# Patient Record
Sex: MALE | Race: WHITE | NOT HISPANIC OR LATINO
[De-identification: names, ages, dates, MRNs, and addresses within clinical notes are randomized per-mention and may not be internally consistent; named-entity substitution may affect disease eponyms.]

---

## 2020-03-16 ENCOUNTER — APPOINTMENT (OUTPATIENT)
Dept: INTERNAL MEDICINE | Facility: CLINIC | Age: 70
End: 2020-03-16
Payer: MEDICARE

## 2020-03-16 VITALS
DIASTOLIC BLOOD PRESSURE: 70 MMHG | HEART RATE: 68 BPM | SYSTOLIC BLOOD PRESSURE: 146 MMHG | BODY MASS INDEX: 26.22 KG/M2 | TEMPERATURE: 98.4 F | HEIGHT: 69 IN | WEIGHT: 177 LBS

## 2020-03-16 VITALS — DIASTOLIC BLOOD PRESSURE: 80 MMHG | SYSTOLIC BLOOD PRESSURE: 130 MMHG

## 2020-03-16 DIAGNOSIS — Z78.9 OTHER SPECIFIED HEALTH STATUS: ICD-10-CM

## 2020-03-16 DIAGNOSIS — Z80.1 FAMILY HISTORY OF MALIGNANT NEOPLASM OF TRACHEA, BRONCHUS AND LUNG: ICD-10-CM

## 2020-03-16 PROCEDURE — 36415 COLL VENOUS BLD VENIPUNCTURE: CPT

## 2020-03-16 PROCEDURE — 99204 OFFICE O/P NEW MOD 45 MIN: CPT | Mod: 25

## 2020-03-16 RX ORDER — LATANOPROST/PF 0.005 %
0.01 DROPS OPHTHALMIC (EYE)
Qty: 8 | Refills: 0 | Status: ACTIVE | COMMUNITY
Start: 2020-01-16

## 2020-03-16 NOTE — PHYSICAL EXAM
[Normal] : normal gait, coordination grossly intact, no focal deficits [de-identified] : cataracts [de-identified] : ROSIE 2/6 Ao area   s1 s2 RR [de-identified] : decreased pulses bilat DP

## 2020-03-16 NOTE — HISTORY OF PRESENT ILLNESS
[FreeTextEntry8] : here to establish care , former px of dr Blanc , last visit 2017\par will get cataract surgery sometimes in April

## 2020-03-16 NOTE — REVIEW OF SYSTEMS
[Negative] : Heme/Lymph [Vision Problems] : vision problems [Chest Pain] : no chest pain [Palpitations] : no palpitations [Claudication] : leg claudication [Lower Ext Edema] : no lower extremity edema [Orthopena] : no orthopnea [FreeTextEntry3] : cataracts and cornea problems

## 2020-03-16 NOTE — HEALTH RISK ASSESSMENT
[Yes] : Yes [No falls in past year] : Patient reported no falls in the past year [0] : 2) Feeling down, depressed, or hopeless: Not at all (0) [] : No [[id number]] : 0

## 2020-03-17 LAB
ALBUMIN SERPL ELPH-MCNC: 4.9 G/DL
ALP BLD-CCNC: 48 U/L
ALT SERPL-CCNC: 23 U/L
ANION GAP SERPL CALC-SCNC: 16 MMOL/L
AST SERPL-CCNC: 24 U/L
BILIRUB SERPL-MCNC: 0.4 MG/DL
BUN SERPL-MCNC: 12 MG/DL
CALCIUM SERPL-MCNC: 9.9 MG/DL
CHLORIDE SERPL-SCNC: 102 MMOL/L
CHOLEST SERPL-MCNC: 217 MG/DL
CHOLEST/HDLC SERPL: 5.2 RATIO
CO2 SERPL-SCNC: 25 MMOL/L
CREAT SERPL-MCNC: 0.86 MG/DL
GLUCOSE SERPL-MCNC: 98 MG/DL
HDLC SERPL-MCNC: 41 MG/DL
LDLC SERPL CALC-MCNC: 145 MG/DL
POTASSIUM SERPL-SCNC: 4.1 MMOL/L
PROT SERPL-MCNC: 7.1 G/DL
SODIUM SERPL-SCNC: 143 MMOL/L
TRIGL SERPL-MCNC: 155 MG/DL

## 2020-04-13 ENCOUNTER — APPOINTMENT (OUTPATIENT)
Dept: INTERNAL MEDICINE | Facility: CLINIC | Age: 70
End: 2020-04-13

## 2021-03-11 ENCOUNTER — TRANSCRIPTION ENCOUNTER (OUTPATIENT)
Age: 71
End: 2021-03-11

## 2022-04-07 ENCOUNTER — RESULT REVIEW (OUTPATIENT)
Age: 72
End: 2022-04-07

## 2022-04-18 ENCOUNTER — NON-APPOINTMENT (OUTPATIENT)
Age: 72
End: 2022-04-18

## 2022-04-20 ENCOUNTER — APPOINTMENT (OUTPATIENT)
Dept: INTERNAL MEDICINE | Facility: CLINIC | Age: 72
End: 2022-04-20
Payer: MEDICARE

## 2022-04-20 VITALS
SYSTOLIC BLOOD PRESSURE: 132 MMHG | WEIGHT: 176 LBS | HEART RATE: 79 BPM | OXYGEN SATURATION: 84 % | DIASTOLIC BLOOD PRESSURE: 80 MMHG | HEIGHT: 69 IN | BODY MASS INDEX: 26.07 KG/M2 | TEMPERATURE: 97.9 F
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
## 2022-04-20 DIAGNOSIS — Z76.89 PERSONS ENCOUNTERING HEALTH SERVICES IN OTHER SPECIFIED CIRCUMSTANCES: ICD-10-CM

## 2022-04-20 DIAGNOSIS — H40.9 UNSPECIFIED GLAUCOMA: ICD-10-CM

## 2022-04-20 PROCEDURE — 99214 OFFICE O/P EST MOD 30 MIN: CPT

## 2022-04-20 NOTE — HEALTH RISK ASSESSMENT
[Never] : Never [0] : 2) Feeling down, depressed, or hopeless: Not at all (0) [PHQ-2 Negative - No further assessment needed] : PHQ-2 Negative - No further assessment needed [QHY1Zilyx] : 0

## 2022-04-20 NOTE — REVIEW OF SYSTEMS
[Vision Problems] : vision problems [Claudication] : leg claudication [Negative] : Heme/Lymph [Chest Pain] : no chest pain [Palpitations] : no palpitations [Lower Ext Edema] : no lower extremity edema [Orthopena] : no orthopnea [Joint Pain] : joint pain [Muscle Pain] : muscle pain [FreeTextEntry3] : cataracts and cornea problems [FreeTextEntry9] : L arm and shoulder

## 2022-04-20 NOTE — HISTORY OF PRESENT ILLNESS
[de-identified] : Mr. Rose comes to office after 2 years of absence.  He fell fell on 3/15, accidental on concrete steps, fell on L arm , iced it and rest had full ROM, pain was getting worse 2 weeks before 4/7 when seen in the ED for pain\par  BP was high and he was stared on Amlodipine 5 mg , Xrays were neg for fracture, L shoulder and arm pain persists

## 2022-06-21 ENCOUNTER — APPOINTMENT (OUTPATIENT)
Dept: INTERNAL MEDICINE | Facility: CLINIC | Age: 72
End: 2022-06-21
Payer: MEDICARE

## 2022-06-21 VITALS
DIASTOLIC BLOOD PRESSURE: 80 MMHG | HEART RATE: 84 BPM | OXYGEN SATURATION: 97 % | TEMPERATURE: 97.8 F | SYSTOLIC BLOOD PRESSURE: 142 MMHG | HEIGHT: 69 IN

## 2022-06-21 VITALS — SYSTOLIC BLOOD PRESSURE: 136 MMHG | DIASTOLIC BLOOD PRESSURE: 70 MMHG

## 2022-06-21 PROCEDURE — 99214 OFFICE O/P EST MOD 30 MIN: CPT

## 2022-06-21 RX ORDER — DICLOFENAC SODIUM 75 MG/1
75 TABLET, DELAYED RELEASE ORAL
Qty: 14 | Refills: 0 | Status: DISCONTINUED | COMMUNITY
Start: 2022-04-20 | End: 2022-06-21

## 2022-06-21 NOTE — HISTORY OF PRESENT ILLNESS
[de-identified] : here for review new findings on MRI L shoulder , the MRI was taken on June 16 by Dr. Diaz prior to rotator cuff repair in the near future\par The MRI revealed full-thickness tear of several tendons and also enlarged axillary lymph nodes on the left, he has no symptoms.

## 2022-06-21 NOTE — PHYSICAL EXAM
[No Lymphadenopathy] : no lymphadenopathy [Supple] : supple [Normal Supraclavicular Nodes] : no supraclavicular lymphadenopathy [Normal Axillary Nodes] : no axillary lymphadenopathy [Normal Posterior Cervical Nodes] : no posterior cervical lymphadenopathy [Normal Anterior Cervical Nodes] : no anterior cervical lymphadenopathy [Normal] : normal gait, coordination grossly intact, no focal deficits and deep tendon reflexes were 2+ and symmetric [de-identified] : very small L aand R axillary LN, nontender [de-identified] : L shoulder limited ROM

## 2022-06-21 NOTE — HEALTH RISK ASSESSMENT
[No] : No [0] : 2) Feeling down, depressed, or hopeless: Not at all (0) [PHQ-2 Negative - No further assessment needed] : PHQ-2 Negative - No further assessment needed [YearQuit] : 2000 [IQS1Iofpv] : 0

## 2022-06-21 NOTE — REVIEW OF SYSTEMS
[Vision Problems] : vision problems [Claudication] : leg claudication [Joint Pain] : joint pain [Muscle Pain] : muscle pain [Negative] : Heme/Lymph [Chest Pain] : no chest pain [Palpitations] : no palpitations [Lower Ext Edema] : no lower extremity edema [Orthopena] : no orthopnea [Easy Bleeding] : no easy bleeding [Easy Bruising] : no easy bruising [Swollen Glands] : no swollen glands [FreeTextEntry9] : L arm and shoulder

## 2022-06-30 ENCOUNTER — RESULT REVIEW (OUTPATIENT)
Age: 72
End: 2022-06-30

## 2022-07-07 ENCOUNTER — APPOINTMENT (OUTPATIENT)
Dept: INTERNAL MEDICINE | Facility: CLINIC | Age: 72
End: 2022-07-07

## 2022-07-07 VITALS — SYSTOLIC BLOOD PRESSURE: 138 MMHG | DIASTOLIC BLOOD PRESSURE: 70 MMHG

## 2022-07-07 VITALS
SYSTOLIC BLOOD PRESSURE: 140 MMHG | WEIGHT: 175 LBS | BODY MASS INDEX: 25.84 KG/M2 | HEART RATE: 77 BPM | TEMPERATURE: 99.1 F | OXYGEN SATURATION: 97 % | DIASTOLIC BLOOD PRESSURE: 76 MMHG

## 2022-07-07 DIAGNOSIS — R59.0 LOCALIZED ENLARGED LYMPH NODES: ICD-10-CM

## 2022-07-07 PROCEDURE — 36415 COLL VENOUS BLD VENIPUNCTURE: CPT

## 2022-07-07 PROCEDURE — 99214 OFFICE O/P EST MOD 30 MIN: CPT | Mod: 25

## 2022-07-08 LAB
ALBUMIN SERPL ELPH-MCNC: 4.6 G/DL
ALP BLD-CCNC: 65 U/L
ALT SERPL-CCNC: 16 U/L
ANION GAP SERPL CALC-SCNC: 13 MMOL/L
AST SERPL-CCNC: 20 U/L
BASOPHILS # BLD AUTO: 0.04 K/UL
BASOPHILS NFR BLD AUTO: 0.6 %
BILIRUB SERPL-MCNC: 0.3 MG/DL
BUN SERPL-MCNC: 10 MG/DL
CALCIUM SERPL-MCNC: 9.7 MG/DL
CHLORIDE SERPL-SCNC: 101 MMOL/L
CO2 SERPL-SCNC: 24 MMOL/L
CREAT SERPL-MCNC: 0.74 MG/DL
EGFR: 96 ML/MIN/1.73M2
EOSINOPHIL # BLD AUTO: 0.07 K/UL
EOSINOPHIL NFR BLD AUTO: 1 %
GLUCOSE SERPL-MCNC: 92 MG/DL
HCT VFR BLD CALC: 38.5 %
HGB BLD-MCNC: 12.2 G/DL
IMM GRANULOCYTES NFR BLD AUTO: 0.4 %
LYMPHOCYTES # BLD AUTO: 0.9 K/UL
LYMPHOCYTES NFR BLD AUTO: 13.2 %
MAN DIFF?: NORMAL
MCHC RBC-ENTMCNC: 27.5 PG
MCHC RBC-ENTMCNC: 31.7 GM/DL
MCV RBC AUTO: 86.7 FL
MONOCYTES # BLD AUTO: 0.57 K/UL
MONOCYTES NFR BLD AUTO: 8.4 %
NEUTROPHILS # BLD AUTO: 5.21 K/UL
NEUTROPHILS NFR BLD AUTO: 76.4 %
PLATELET # BLD AUTO: 252 K/UL
POTASSIUM SERPL-SCNC: 4.4 MMOL/L
PROT SERPL-MCNC: 6.7 G/DL
RBC # BLD: 4.44 M/UL
RBC # FLD: 13.4 %
SODIUM SERPL-SCNC: 139 MMOL/L
WBC # FLD AUTO: 6.82 K/UL

## 2022-07-12 ENCOUNTER — RESULT REVIEW (OUTPATIENT)
Age: 72
End: 2022-07-12

## 2022-07-12 NOTE — PHYSICAL EXAM
[Normal] : normal gait, coordination grossly intact, no focal deficits [de-identified] : ROSIE 2/6 AO area   s1 s2 RR [de-identified] : full  and tender ROM , L shoulder and elbow

## 2022-07-12 NOTE — HISTORY OF PRESENT ILLNESS
[No Pertinent Cardiac History] : no history of aortic stenosis, atrial fibrillation, coronary artery disease, recent myocardial infarction, or implantable device/pacemaker [No Pertinent Pulmonary History] : no history of asthma, COPD, sleep apnea, or smoking [No Adverse Anesthesia Reaction] : no adverse anesthesia reaction in self or family member [(Patient denies any chest pain, claudication, dyspnea on exertion, orthopnea, palpitations or syncope)] : Patient denies any chest pain, claudication, dyspnea on exertion, orthopnea, palpitations or syncope [Good (7-10 METs)] : Good (7-10 METs) [Chronic Anticoagulation] : no chronic anticoagulation [Chronic Kidney Disease] : no chronic kidney disease [Diabetes] : no diabetes [FreeTextEntry1] : L rotator cuff repair [FreeTextEntry2] : 7/18 [FreeTextEntry3] : dr Diaz [FreeTextEntry4] : Prashant comes in today for preoperative evaluation prior to left rotator cuff repair with Dr. Diaz on July 18.  He had an MRI on June 16 that showed complete tear of tendons in the left shoulder and surgery was recommended.  Incidentally the MRI showed some enlarged lymph nodes in the axilla and the ultrasound did not confirm anything suspicious.  He feels fine today.

## 2022-07-12 NOTE — REVIEW OF SYSTEMS
[Claudication] : leg claudication [Joint Pain] : joint pain [Muscle Pain] : muscle pain [Negative] : Heme/Lymph [Chest Pain] : no chest pain [Palpitations] : no palpitations [Lower Ext Edema] : no lower extremity edema [Orthopena] : no orthopnea [Easy Bleeding] : no easy bleeding [Easy Bruising] : no easy bruising [Swollen Glands] : no swollen glands [FreeTextEntry5] : R leg [FreeTextEntry9] : L arm and shoulder

## 2022-07-18 ENCOUNTER — RX RENEWAL (OUTPATIENT)
Age: 72
End: 2022-07-18

## 2022-08-01 ENCOUNTER — APPOINTMENT (OUTPATIENT)
Dept: INTERNAL MEDICINE | Facility: CLINIC | Age: 72
End: 2022-08-01

## 2022-08-01 VITALS
DIASTOLIC BLOOD PRESSURE: 70 MMHG | WEIGHT: 175 LBS | HEART RATE: 69 BPM | HEIGHT: 69 IN | BODY MASS INDEX: 25.92 KG/M2 | OXYGEN SATURATION: 96 % | SYSTOLIC BLOOD PRESSURE: 152 MMHG | TEMPERATURE: 97.9 F

## 2022-08-01 VITALS — SYSTOLIC BLOOD PRESSURE: 160 MMHG | DIASTOLIC BLOOD PRESSURE: 78 MMHG

## 2022-08-01 DIAGNOSIS — Z00.00 ENCOUNTER FOR GENERAL ADULT MEDICAL EXAMINATION W/OUT ABNORMAL FINDINGS: ICD-10-CM

## 2022-08-01 DIAGNOSIS — E78.1 PURE HYPERGLYCERIDEMIA: ICD-10-CM

## 2022-08-01 DIAGNOSIS — M19.019 PRIMARY OSTEOARTHRITIS, UNSPECIFIED SHOULDER: ICD-10-CM

## 2022-08-01 DIAGNOSIS — Z01.818 ENCOUNTER FOR OTHER PREPROCEDURAL EXAMINATION: ICD-10-CM

## 2022-08-01 PROCEDURE — G0439: CPT

## 2022-08-01 PROCEDURE — 99214 OFFICE O/P EST MOD 30 MIN: CPT | Mod: 25

## 2022-08-01 PROCEDURE — 36415 COLL VENOUS BLD VENIPUNCTURE: CPT

## 2022-08-01 RX ORDER — OXYCODONE 5 MG/1
5 TABLET ORAL
Qty: 20 | Refills: 0 | Status: DISCONTINUED | COMMUNITY
Start: 2022-07-05 | End: 2022-08-01

## 2022-08-01 NOTE — PHYSICAL EXAM
[Normal] : no JVD, supple, thyroid normal/no nodules, no lymphadenopathy [de-identified] : ROSIE 2/6 AO area   s1 s2 RR [de-identified] : decreased pulses DP bilat [de-identified] : no gross  lumps bilat [de-identified] : L arm in sling

## 2022-08-01 NOTE — HEALTH RISK ASSESSMENT
[Very Good] : ~his/her~  mood as very good [Former] : Former [20 or more] : 20 or more [Yes] : Yes [Patient reported colonoscopy was normal] : Patient reported colonoscopy was normal [HIV test declined] : HIV test declined [Hepatitis C test offered] : Hepatitis C test offered [Alone] : lives alone [With Family] : lives with family [Employed] : employed [High School] : high school [] :  [# Of Children ___] : has [unfilled] children [YearQuit] : 2002 [ColonoscopyDate] : 01/16

## 2022-08-01 NOTE — REVIEW OF SYSTEMS
[Claudication] : leg claudication [Joint Pain] : joint pain [Chest Pain] : no chest pain [Palpitations] : no palpitations [Lower Ext Edema] : no lower extremity edema [Orthopena] : no orthopnea [Muscle Pain] : no muscle pain [Easy Bleeding] : no easy bleeding [Easy Bruising] : no easy bruising [Swollen Glands] : no swollen glands [Negative] : Neurological [FreeTextEntry5] : R leg [FreeTextEntry9] : better , still in sling

## 2022-08-02 LAB
ALBUMIN SERPL ELPH-MCNC: 4.8 G/DL
ALP BLD-CCNC: 76 U/L
ALT SERPL-CCNC: 63 U/L
ANION GAP SERPL CALC-SCNC: 14 MMOL/L
APPEARANCE: CLEAR
AST SERPL-CCNC: 33 U/L
BASOPHILS # BLD AUTO: 0.06 K/UL
BASOPHILS NFR BLD AUTO: 0.7 %
BILIRUB SERPL-MCNC: 0.4 MG/DL
BILIRUBIN URINE: NEGATIVE
BLOOD URINE: NEGATIVE
BUN SERPL-MCNC: 10 MG/DL
CALCIUM SERPL-MCNC: 10 MG/DL
CHLORIDE SERPL-SCNC: 96 MMOL/L
CHOLEST SERPL-MCNC: 249 MG/DL
CO2 SERPL-SCNC: 26 MMOL/L
COLOR: NORMAL
CREAT SERPL-MCNC: 0.76 MG/DL
EGFR: 96 ML/MIN/1.73M2
EOSINOPHIL # BLD AUTO: 0.12 K/UL
EOSINOPHIL NFR BLD AUTO: 1.3 %
FOLATE SERPL-MCNC: 18.8 NG/ML
GLUCOSE QUALITATIVE U: NEGATIVE
GLUCOSE SERPL-MCNC: 102 MG/DL
HCT VFR BLD CALC: 39.4 %
HCV AB SER QL: NONREACTIVE
HCV S/CO RATIO: 0.09 S/CO
HDLC SERPL-MCNC: 45 MG/DL
HGB BLD-MCNC: 12.8 G/DL
IMM GRANULOCYTES NFR BLD AUTO: 1.3 %
KETONES URINE: NEGATIVE
LDLC SERPL CALC-MCNC: 166 MG/DL
LEUKOCYTE ESTERASE URINE: NEGATIVE
LYMPHOCYTES # BLD AUTO: 1.09 K/UL
LYMPHOCYTES NFR BLD AUTO: 12.1 %
MAN DIFF?: NORMAL
MCHC RBC-ENTMCNC: 27.4 PG
MCHC RBC-ENTMCNC: 32.5 GM/DL
MCV RBC AUTO: 84.4 FL
MONOCYTES # BLD AUTO: 0.57 K/UL
MONOCYTES NFR BLD AUTO: 6.3 %
NEUTROPHILS # BLD AUTO: 7.04 K/UL
NEUTROPHILS NFR BLD AUTO: 78.3 %
NITRITE URINE: NEGATIVE
NONHDLC SERPL-MCNC: 204 MG/DL
PH URINE: 7
PLATELET # BLD AUTO: 276 K/UL
POTASSIUM SERPL-SCNC: 4.7 MMOL/L
PROT SERPL-MCNC: 7.5 G/DL
PROTEIN URINE: NEGATIVE
PSA FREE FLD-MCNC: 31 %
PSA FREE SERPL-MCNC: 0.28 NG/ML
PSA SERPL-MCNC: 0.9 NG/ML
RBC # BLD: 4.67 M/UL
RBC # FLD: 14.5 %
SODIUM SERPL-SCNC: 136 MMOL/L
SPECIFIC GRAVITY URINE: 1.01
T4 FREE SERPL-MCNC: 1.2 NG/DL
TRIGL SERPL-MCNC: 192 MG/DL
TSH SERPL-ACNC: 1.39 UIU/ML
UROBILINOGEN URINE: NORMAL
VIT B12 SERPL-MCNC: 1153 PG/ML
WBC # FLD AUTO: 9 K/UL

## 2022-08-29 ENCOUNTER — NON-APPOINTMENT (OUTPATIENT)
Age: 72
End: 2022-08-29

## 2022-08-29 RX ORDER — ROSUVASTATIN CALCIUM 5 MG/1
5 TABLET, FILM COATED ORAL
Qty: 90 | Refills: 0 | Status: DISCONTINUED | COMMUNITY
Start: 2022-08-03 | End: 2022-08-29

## 2022-10-24 ENCOUNTER — RX RENEWAL (OUTPATIENT)
Age: 72
End: 2022-10-24

## 2022-11-21 ENCOUNTER — RX RENEWAL (OUTPATIENT)
Age: 72
End: 2022-11-21

## 2023-01-23 ENCOUNTER — OFFICE (OUTPATIENT)
Dept: URBAN - METROPOLITAN AREA CLINIC 29 | Facility: CLINIC | Age: 73
Setting detail: OPHTHALMOLOGY
End: 2023-01-23
Payer: MEDICARE

## 2023-01-23 ENCOUNTER — RX RENEWAL (OUTPATIENT)
Age: 73
End: 2023-01-23

## 2023-01-23 DIAGNOSIS — H40.003: ICD-10-CM

## 2023-01-23 DIAGNOSIS — H25.13: ICD-10-CM

## 2023-01-23 DIAGNOSIS — H35.40: ICD-10-CM

## 2023-01-23 DIAGNOSIS — H18.513: ICD-10-CM

## 2023-01-23 PROCEDURE — 99214 OFFICE O/P EST MOD 30 MIN: CPT | Performed by: OPHTHALMOLOGY

## 2023-01-23 PROCEDURE — 92133 CPTRZD OPH DX IMG PST SGM ON: CPT | Performed by: OPHTHALMOLOGY

## 2023-01-23 RX ORDER — ESCITALOPRAM OXALATE 5 MG/1
5 TABLET ORAL
Qty: 90 | Refills: 0 | Status: ACTIVE | COMMUNITY
Start: 2020-02-09 | End: 1900-01-01

## 2023-01-23 ASSESSMENT — SPHEQUIV_DERIVED
OS_SPHEQUIV: -0.75
OS_SPHEQUIV: -1.5
OD_SPHEQUIV: -1.875
OS_SPHEQUIV: -0.375
OD_SPHEQUIV: -2.75

## 2023-01-23 ASSESSMENT — VISUAL ACUITY
OD_BCVA: 20/40
OS_BCVA: 20/30-1

## 2023-01-23 ASSESSMENT — KERATOMETRY
OD_AXISANGLE_DEGREES: 90
OD_K2POWER_DIOPTERS: 43.75
OS_K2POWER_DIOPTERS: 44.25
OS_AXISANGLE_DEGREES: 135
OD_K1POWER_DIOPTERS: 43.75
OS_K1POWER_DIOPTERS: 43.25

## 2023-01-23 ASSESSMENT — REFRACTION_CURRENTRX
OD_CYLINDER: +0.75
OS_CYLINDER: +1.25
OD_AXIS: 39
OS_SPHERE: -2.00
OD_SPHERE: -2.25
OS_OVR_VA: 20/
OD_OVR_VA: 20/
OS_AXIS: 168

## 2023-01-23 ASSESSMENT — REFRACTION_MANIFEST
OS_AXIS: 165
OD_CYLINDER: +0.75
OS_VA1: 20/50+2
OD_ADD: +2.75
OD_VA1: 20/40
OS_SPHERE: -1.25
OD_VA1: 20/40+1
OD_SPHERE: -2.25
OS_AXIS: 170
OD_AXIS: 45
OD_CYLINDER: SPH
OS_ADD: +2.75
OS_CYLINDER: +1.75
OD_SPHERE: -1.75
OS_SPHERE: -1.50
OS_CYLINDER: +1.50
OS_VA1: 20/40-2

## 2023-01-23 ASSESSMENT — CORNEAL DYSTROPHY - POSTERIOR
OS_POSTERIORDYSTROPHY: FUCHS GUTTATA
OD_POSTERIORDYSTROPHY: FUCHS GUTTATA

## 2023-01-23 ASSESSMENT — CONFRONTATIONAL VISUAL FIELD TEST (CVF)
OS_FINDINGS: FULL
OD_FINDINGS: FULL

## 2023-01-23 ASSESSMENT — AXIALLENGTH_DERIVED
OS_AL: 24.095
OS_AL: 23.794
OS_AL: 23.6463
OD_AL: 24.6139
OD_AL: 24.2484

## 2023-01-23 ASSESSMENT — REFRACTION_AUTOREFRACTION
OD_CYLINDER: +1.50
OD_SPHERE: -3.50
OS_SPHERE: -2.25
OS_CYLINDER: +1.50
OD_AXIS: 15
OS_AXIS: 160

## 2023-01-23 ASSESSMENT — PACHYMETRY
OS_CT_CORRECTION: -5
OD_CT_CORRECTION: -6
OS_CT_UM: 614
OD_CT_UM: 624

## 2023-01-23 ASSESSMENT — TONOMETRY
OS_IOP_MMHG: 18
OD_IOP_MMHG: 18

## 2023-02-22 ENCOUNTER — RX RENEWAL (OUTPATIENT)
Age: 73
End: 2023-02-22

## 2023-03-09 ENCOUNTER — APPOINTMENT (OUTPATIENT)
Dept: INTERNAL MEDICINE | Facility: CLINIC | Age: 73
End: 2023-03-09
Payer: MEDICARE

## 2023-03-09 VITALS
OXYGEN SATURATION: 97 % | HEART RATE: 70 BPM | BODY MASS INDEX: 26.22 KG/M2 | HEIGHT: 69 IN | SYSTOLIC BLOOD PRESSURE: 142 MMHG | WEIGHT: 177 LBS | TEMPERATURE: 97.7 F | DIASTOLIC BLOOD PRESSURE: 70 MMHG

## 2023-03-09 VITALS — DIASTOLIC BLOOD PRESSURE: 80 MMHG | SYSTOLIC BLOOD PRESSURE: 146 MMHG

## 2023-03-09 DIAGNOSIS — H91.90 UNSPECIFIED HEARING LOSS, UNSPECIFIED EAR: ICD-10-CM

## 2023-03-09 DIAGNOSIS — H90.12 CONDUCTIVE HEARING LOSS, UNILATERAL, LEFT EAR, WITH UNRESTRICTED HEARING ON THE CONTRALATERAL SIDE: ICD-10-CM

## 2023-03-09 DIAGNOSIS — H61.22 IMPACTED CERUMEN, LEFT EAR: ICD-10-CM

## 2023-03-09 PROCEDURE — 36415 COLL VENOUS BLD VENIPUNCTURE: CPT

## 2023-03-09 PROCEDURE — 99214 OFFICE O/P EST MOD 30 MIN: CPT | Mod: 25

## 2023-03-09 NOTE — HISTORY OF PRESENT ILLNESS
[FreeTextEntry8] : c/o hearing loss L ear since Tue, he tried to get out some wax using Qtip, R ear hearing normal, also needs fasting lipids after started statin last summer\par BP check slightly high

## 2023-03-09 NOTE — REVIEW OF SYSTEMS
[Hearing Loss] : hearing loss [Postnasal Drip] : postnasal drip [Negative] : Neurological [FreeTextEntry4] : L ear see HPI

## 2023-03-10 LAB
ALBUMIN SERPL ELPH-MCNC: 4.8 G/DL
ALP BLD-CCNC: 66 U/L
ALT SERPL-CCNC: 25 U/L
ANION GAP SERPL CALC-SCNC: 14 MMOL/L
AST SERPL-CCNC: 29 U/L
BILIRUB SERPL-MCNC: 0.4 MG/DL
BUN SERPL-MCNC: 6 MG/DL
CALCIUM SERPL-MCNC: 10 MG/DL
CHLORIDE SERPL-SCNC: 101 MMOL/L
CHOLEST SERPL-MCNC: 142 MG/DL
CO2 SERPL-SCNC: 26 MMOL/L
CREAT SERPL-MCNC: 0.76 MG/DL
EGFR: 96 ML/MIN/1.73M2
ESTIMATED AVERAGE GLUCOSE: 108 MG/DL
GLUCOSE SERPL-MCNC: 115 MG/DL
HBA1C MFR BLD HPLC: 5.4 %
HDLC SERPL-MCNC: 44 MG/DL
LDLC SERPL CALC-MCNC: 77 MG/DL
NONHDLC SERPL-MCNC: 97 MG/DL
POTASSIUM SERPL-SCNC: 4.6 MMOL/L
PROT SERPL-MCNC: 6.8 G/DL
SODIUM SERPL-SCNC: 140 MMOL/L
TRIGL SERPL-MCNC: 103 MG/DL

## 2023-04-10 ENCOUNTER — APPOINTMENT (OUTPATIENT)
Dept: INTERNAL MEDICINE | Facility: CLINIC | Age: 73
End: 2023-04-10
Payer: MEDICARE

## 2023-04-10 VITALS
SYSTOLIC BLOOD PRESSURE: 140 MMHG | DIASTOLIC BLOOD PRESSURE: 76 MMHG | TEMPERATURE: 97 F | HEART RATE: 68 BPM | BODY MASS INDEX: 26.14 KG/M2 | OXYGEN SATURATION: 98 % | WEIGHT: 177 LBS

## 2023-04-10 VITALS — DIASTOLIC BLOOD PRESSURE: 78 MMHG | SYSTOLIC BLOOD PRESSURE: 140 MMHG

## 2023-04-10 DIAGNOSIS — E78.5 HYPERLIPIDEMIA, UNSPECIFIED: ICD-10-CM

## 2023-04-10 PROCEDURE — 99214 OFFICE O/P EST MOD 30 MIN: CPT

## 2023-04-10 NOTE — HISTORY OF PRESENT ILLNESS
[de-identified] : Prashant  comes in  for BP check after CCB was increased , he also needs his home BP monitoring evaluated against our device , he feels well

## 2023-04-10 NOTE — PHYSICAL EXAM
[No Edema] : there was no peripheral edema [Normal] : normal gait, coordination grossly intact, no focal deficits and deep tendon reflexes were 2+ and symmetric [de-identified] : ant. chest old mole and another scratched one - not infected

## 2023-04-10 NOTE — REVIEW OF SYSTEMS
[Skin Rash] : skin rash [Negative] : Neurological [de-identified] : ant chest scratched a mole Anesthesia Volume In Cc: 5

## 2023-04-18 ENCOUNTER — RESULT REVIEW (OUTPATIENT)
Age: 73
End: 2023-04-18

## 2023-04-19 ENCOUNTER — RESULT REVIEW (OUTPATIENT)
Age: 73
End: 2023-04-19

## 2023-04-21 ENCOUNTER — NON-APPOINTMENT (OUTPATIENT)
Age: 73
End: 2023-04-21

## 2023-05-08 ENCOUNTER — NON-APPOINTMENT (OUTPATIENT)
Age: 73
End: 2023-05-08

## 2023-05-08 ENCOUNTER — APPOINTMENT (OUTPATIENT)
Dept: CARDIOLOGY | Facility: CLINIC | Age: 73
End: 2023-05-08
Payer: MEDICARE

## 2023-05-08 VITALS
HEART RATE: 82 BPM | BODY MASS INDEX: 26.36 KG/M2 | RESPIRATION RATE: 14 BRPM | WEIGHT: 178 LBS | OXYGEN SATURATION: 97 % | DIASTOLIC BLOOD PRESSURE: 70 MMHG | HEIGHT: 69 IN | SYSTOLIC BLOOD PRESSURE: 151 MMHG

## 2023-05-08 DIAGNOSIS — I35.0 NONRHEUMATIC AORTIC (VALVE) STENOSIS: ICD-10-CM

## 2023-05-08 DIAGNOSIS — H26.9 UNSPECIFIED CATARACT: ICD-10-CM

## 2023-05-08 PROCEDURE — 99204 OFFICE O/P NEW MOD 45 MIN: CPT

## 2023-05-08 PROCEDURE — 93000 ELECTROCARDIOGRAM COMPLETE: CPT

## 2023-05-08 NOTE — PHYSICAL EXAM
[Well Developed] : well developed [Well Nourished] : well nourished [No Acute Distress] : no acute distress [Normal Conjunctiva] : normal conjunctiva [Normal Venous Pressure] : normal venous pressure [No Carotid Bruit] : no carotid bruit [Normal Rate] : normal [Rhythm Regular] : regular [Normal S1] : normal S1 [Normal S2] : normal S2 [II] : a grade 2 [No Pitting Edema] : no pitting edema present [2+] : right 2+ [Clear Lung Fields] : clear lung fields [Good Air Entry] : good air entry [No Respiratory Distress] : no respiratory distress  [No Edema] : no edema [No Cyanosis] : no cyanosis [No Clubbing] : no clubbing [No Varicosities] : no varicosities [No Rash] : no rash [No Skin Lesions] : no skin lesions [Moves all extremities] : moves all extremities [No Focal Deficits] : no focal deficits [Normal Speech] : normal speech [Alert and Oriented] : alert and oriented [Normal memory] : normal memory [S3] : no S3 [S4] : no S4 [Right Carotid Bruit] : no bruit heard over the right carotid [Left Carotid Bruit] : no bruit heard over the left carotid

## 2023-05-08 NOTE — HISTORY OF PRESENT ILLNESS
[FreeTextEntry1] : 71 yo male with hypertension, PAD/claudication, and recently diagnosed CAD per 4/18/23 coronary calcium score (total Agatston score = 3452, , , LCx 553, RCA 1935). Patient denies chest pain, dyspnea, palpitations, syncope, edema, melena, hematochezia, or hematemesis. However, he does not engage in regular exercise due to claudication. He reports that he will be seeing Dr. Walter for follow-up of his PAD of lower extremities.\par

## 2023-05-08 NOTE — ASSESSMENT
[FreeTextEntry1] : 73 yo male with hypertension, PAD/claudication, and recently diagnosed CAD per 4/18/23 coronary calcium score (total Agatston score = 3452, , , LCx 553, RCA 1935). \par \par ECG today demonstrated sinus, rate 69 bpm, delayed R wave progression.\par Will order Lexiscan nuclear stress test for ischemic evaluation/risk stratification. Patient is unable to use treadmill due to claudication.\par Will continue atorvastatin 20 mg po daily and amlodipine 10 mg po daily at this time.\par Will start ASA EC 81 mg po daily.\par Pending review of stress test results, will determine if further cardiac work-up or intervention is clinically indicated.\par \par BP is currently adequately controlled.\par Will continue amlodipine 10 mg po daily.\par \par Patient with mild AS per 7/2022 echo.\par Will continue to monitor at this time. Cardiac auscultation today is not suggestive of progression of AS.\par \par He reports that he will be seeing Dr. Walter for follow-up of his PAD of lower extremities.\par Will start aspirin as above and continue atorvastatin 20 mg po daily.

## 2023-05-08 NOTE — CARDIOLOGY SUMMARY
[de-identified] : \par 5/8/23 ECG: Sinus, rate 69 bpm, delayed R wave progression\par \par  [de-identified] : \par 7/13/22 Echo (at [facility]): Normal LV size and systolic/diastolic function, LVEF 60-65%. Normal RV size and systolic function. Mild AS (WATSON 1.9 cm2, mean gradient 21 mmHg, DVI 0.42). Trace AR. Mildly dilated aortic root (3.9 cm). [de-identified] : \par 4/18/23 Coronary calcium score (at [facility]):\par Total Agatston score = 3452 (, , LCx 553, RCA 1935)\par  [de-identified] : \par 4/20/23 Lower extremity arterial Doppler:\par > 75% stenosis right mid common femoral artery\par < 50% stenosis origin of right superficial femoral artery\par > 75% stenosis proximal/mid right superficial femoral artery\par < 50% stenosis right distal superficial femoral artery\par

## 2023-05-15 ENCOUNTER — APPOINTMENT (OUTPATIENT)
Dept: INTERNAL MEDICINE | Facility: CLINIC | Age: 73
End: 2023-05-15
Payer: MEDICARE

## 2023-05-15 VITALS
OXYGEN SATURATION: 97 % | WEIGHT: 178 LBS | HEIGHT: 69 IN | DIASTOLIC BLOOD PRESSURE: 62 MMHG | TEMPERATURE: 97.8 F | SYSTOLIC BLOOD PRESSURE: 122 MMHG | HEART RATE: 65 BPM | BODY MASS INDEX: 26.36 KG/M2

## 2023-05-15 DIAGNOSIS — F32.A ANXIETY DISORDER, UNSPECIFIED: ICD-10-CM

## 2023-05-15 DIAGNOSIS — I25.10 ATHEROSCLEROTIC HEART DISEASE OF NATIVE CORONARY ARTERY W/OUT ANGINA PECTORIS: ICD-10-CM

## 2023-05-15 DIAGNOSIS — I10 ESSENTIAL (PRIMARY) HYPERTENSION: ICD-10-CM

## 2023-05-15 DIAGNOSIS — F41.9 ANXIETY DISORDER, UNSPECIFIED: ICD-10-CM

## 2023-05-15 DIAGNOSIS — I73.9 PERIPHERAL VASCULAR DISEASE, UNSPECIFIED: ICD-10-CM

## 2023-05-15 PROCEDURE — 99214 OFFICE O/P EST MOD 30 MIN: CPT

## 2023-05-15 NOTE — PHYSICAL EXAM
[No Edema] : there was no peripheral edema [Normal] : normal gait, coordination grossly intact, no focal deficits and deep tendon reflexes were 2+ and symmetric [de-identified] : mary 2 /6, RR [de-identified] : decreased pulses

## 2023-05-15 NOTE — REVIEW OF SYSTEMS
[Claudication] : leg claudication [Skin Rash] : no skin rash [Negative] : Neurological [FreeTextEntry5] : RLE

## 2023-05-15 NOTE — HISTORY OF PRESENT ILLNESS
[de-identified] : He comes in for medical follow-up and blood pressure check, and also after seen by cardiologist for an abnormal CT coronary calcium score.  He is still due to see his vascular surgeon Dr. Walter for PVD.\par Scheduled for a nuclear stress test early June.

## 2023-05-21 ENCOUNTER — RX RENEWAL (OUTPATIENT)
Age: 73
End: 2023-05-21

## 2023-06-06 ENCOUNTER — RESULT REVIEW (OUTPATIENT)
Age: 73
End: 2023-06-06

## 2023-07-11 DIAGNOSIS — R59.0 LOCALIZED ENLARGED LYMPH NODES: ICD-10-CM

## 2023-07-27 NOTE — HISTORY OF PRESENT ILLNESS
[de-identified] : Mr. Prashant Rose is 73 year old male with lymphadenopathy on abdomen here for consultation, referred by Dr. Bessy Rodriguez.\par \par Patient with hx of anxieyt and depression, aortic stenosis, CAD, glaucoma, hearing loss, HTN, PVD who \par \par 7/10//2023 CT with Dr. Walter at [facility]\par lymphadenopathy intra-abdominal and peritoneal. \par \par FHx:\par father with lung cancer\par \par Social History\par Smoking - never\par Alcohol - social\par Illicit drugs - \par Work -\par

## 2023-08-02 ENCOUNTER — APPOINTMENT (OUTPATIENT)
Dept: HEMATOLOGY ONCOLOGY | Facility: CLINIC | Age: 73
End: 2023-08-02

## 2023-08-15 ENCOUNTER — RX RENEWAL (OUTPATIENT)
Age: 73
End: 2023-08-15

## 2023-08-15 RX ORDER — ATORVASTATIN CALCIUM 20 MG/1
20 TABLET, FILM COATED ORAL
Qty: 90 | Refills: 0 | Status: ACTIVE | COMMUNITY
Start: 2022-08-29 | End: 1900-01-01

## 2023-09-25 ENCOUNTER — RX RENEWAL (OUTPATIENT)
Age: 73
End: 2023-09-25

## 2023-12-17 ENCOUNTER — RX RENEWAL (OUTPATIENT)
Age: 73
End: 2023-12-17

## 2024-01-08 ENCOUNTER — RX RENEWAL (OUTPATIENT)
Age: 74
End: 2024-01-08

## 2024-01-08 RX ORDER — RISPERIDONE 2 MG/1
2 TABLET, FILM COATED ORAL
Qty: 90 | Refills: 0 | Status: ACTIVE | COMMUNITY
Start: 2020-02-09 | End: 1900-01-01

## 2024-03-12 ENCOUNTER — RX RENEWAL (OUTPATIENT)
Age: 74
End: 2024-03-12

## 2024-03-12 RX ORDER — AMLODIPINE BESYLATE 10 MG/1
10 TABLET ORAL
Qty: 90 | Refills: 0 | Status: ACTIVE | COMMUNITY
Start: 2022-04-20 | End: 1900-01-01